# Patient Record
Sex: FEMALE | Race: BLACK OR AFRICAN AMERICAN
[De-identification: names, ages, dates, MRNs, and addresses within clinical notes are randomized per-mention and may not be internally consistent; named-entity substitution may affect disease eponyms.]

---

## 2018-02-07 ENCOUNTER — HOSPITAL ENCOUNTER (EMERGENCY)
Dept: HOSPITAL 17 - NEPC | Age: 28
Discharge: HOME | End: 2018-02-07
Payer: MEDICAID

## 2018-02-07 VITALS
DIASTOLIC BLOOD PRESSURE: 89 MMHG | OXYGEN SATURATION: 100 % | RESPIRATION RATE: 16 BRPM | HEART RATE: 83 BPM | SYSTOLIC BLOOD PRESSURE: 187 MMHG | TEMPERATURE: 98.3 F

## 2018-02-07 DIAGNOSIS — Z79.899: ICD-10-CM

## 2018-02-07 DIAGNOSIS — J11.1: Primary | ICD-10-CM

## 2018-02-07 DIAGNOSIS — K21.9: ICD-10-CM

## 2018-02-07 DIAGNOSIS — Z88.8: ICD-10-CM

## 2018-02-07 DIAGNOSIS — I10: ICD-10-CM

## 2018-02-07 LAB
COLOR UR: YELLOW
GLUCOSE UR STRIP-MCNC: (no result) MG/DL
HGB UR QL STRIP: (no result)
KETONES UR STRIP-MCNC: (no result) MG/DL
MUCOUS THREADS #/AREA URNS LPF: (no result) /LPF
NITRITE UR QL STRIP: (no result)
SP GR UR STRIP: 1.02 (ref 1–1.03)
SQUAMOUS #/AREA URNS HPF: 4 /HPF (ref 0–5)
URINE LEUKOCYTE ESTERASE: (no result)

## 2018-02-07 PROCEDURE — 99283 EMERGENCY DEPT VISIT LOW MDM: CPT

## 2018-02-07 PROCEDURE — 84703 CHORIONIC GONADOTROPIN ASSAY: CPT

## 2018-02-07 PROCEDURE — 81001 URINALYSIS AUTO W/SCOPE: CPT

## 2018-02-07 NOTE — PD
HPI


Chief Complaint:  Cold / Flu Symptoms


Time Seen by Provider:  07:44


Travel History


International Travel<30 days:  No


Contact w/Intl Traveler<30days:  No


Traveled to known affect area:  No





History of Present Illness


HPI


Patient is a 27-year-old female presents emergency department with runny nose 

cough congestion fever and body aches as well as some nausea and some 

intermittent leg weakness for the past 24-36 hours.  Her young child was sick 

after coming home from  a few days ago with similar symptoms.  She is 

concerned that she might have the flu.  Denies any chest pain shortness of 

breath abdominal pain or diarrhea.  Denies possibility of pregnancy, denies any 

vaginal bleeding or vaginal discharge.  Symptoms for the past 24-36 hours, 

gradually worsening, associated sinus symptoms as above, context as above





PFSH


Past Medical History


Autoimmune Disease:  No


Blood Disorders:  No


Anxiety:  No


Depression:  No


Cardiovascular Problems:  Yes (HTN)


Diminished Hearing:  No


Gastrointestinal Disorders:  Yes


GERD:  Yes


Genitourinary:  No


Hypertension:  Yes


Musculoskeletal:  No


Neurologic:  No


Psychiatric:  No


Respiratory:  No


Immunizations Current:  Yes


Tetanus Vaccination:  < 5 Years


Influenza Vaccination:  No


Pregnant?:  Not Pregnant


LMP:  2018


:  1


Para:  1


Miscarriage:  0


:  0





Past Surgical History


 Section:  Yes


Cholecystectomy:  Yes ()


Other Surgery:  No





Social History


Alcohol Use:  Yes (OCC)


Tobacco Use:  No


Substance Use:  No





Allergies-Medications


(Allergen,Severity, Reaction):  


Coded Allergies:  


     lisinopril (Unverified  Allergy, Unknown, 18)


Reported Meds & Prescriptions





Reported Meds & Active Scripts


Active


Zofran (Ondansetron HCl) 4 Mg Tab 4 Mg PO Q6HR PRN


Tamiflu (Oseltamivir Phosphate) 75 Mg Cap 75 Mg PO BID 5 Days


Reported


Amlodipine (Amlodipine Besylate) 2.5 Mg Tab 2.5 Mg PO DAILY


Hydrochlorothiazide 25 Mg Tab 25 Mg PO DAILY


Labetalol (Labetalol HCl) 100 Mg Tab 100 Mg PO TID








Review of Systems


Except as stated in HPI:  all other systems reviewed are Neg





Physical Exam


Narrative


GENERAL:  WD/WN in nad


SKIN: Warm and dry.


HEAD: Atraumatic. Normocephalic. 


EYES: Pupils equal and round. No scleral icterus. No injection or drainage. 


ENT: No nasal bleeding or discharge.  Mucous membranes pink and moist.  TM's 

clear bilat, Oropharynx with minimal erythema, no swelling, uvula midline.


NECK: Trachea midline. No JVD. 


CARDIOVASCULAR: Regular rate and rhythm.  


RESPIRATORY: No accessory muscle use. Clear to auscultation. Breath sounds 

equal bilaterally. 


GASTROINTESTINAL: Abdomen soft, non-tender, nondistended. Hepatic and splenic 

margins not palpable. 


MUSCULOSKELETAL: Extremities without clubbing, cyanosis, or edema. No obvious 

deformities. 


NEUROLOGICAL: Awake and alert. No obvious cranial nerve deficits.  Motor 

grossly within normal limits. Five out of 5 muscle strength in the arms and 

legs.  Normal speech.


PSYCHIATRIC: Appropriate mood and affect; insight and judgment normal.





Data


Data


Last Documented VS





Vital Signs








  Date Time  Temp Pulse Resp B/P (MAP) Pulse Ox O2 Delivery O2 Flow Rate FiO2


 


18 09:14        


 


18 07:35      Room Air  


 


18 07:27 98.3 83 16  100   








Orders





 Orders


Ed Urine Pregnancytest Poc (18 07:44)


Ua Includes Microscopic (18 07:44)


Ondansetron  Odt (Zofran  Odt) (18 08:45)


Ed Discharge Order (18 08:45)





Labs





Laboratory Tests








Test


  18


08:15


 


Urine Color YELLOW 


 


Urine Turbidity CLEAR 


 


Urine pH 7.0 


 


Urine Specific Gravity 1.024 


 


Urine Protein TRACE mg/dL 


 


Urine Glucose (UA) NEG mg/dL 


 


Urine Ketones NEG mg/dL 


 


Urine Occult Blood NEG 


 


Urine Nitrite NEG 


 


Urine Bilirubin NEG 


 


Urine Urobilinogen


  LESS THAN 2.0


MG/DL


 


Urine Leukocyte Esterase SMALL 


 


Urine RBC 1 /hpf 


 


Urine Squamous Epithelial


Cells 4 /hpf 


 


 


Urine Mucus FEW /lpf 











MDM


Medical Decision Making


Medical Screen Exam Complete:  Yes


Emergency Medical Condition:  Yes


Differential Diagnosis


Influenza, pneumonia, URI.


Narrative Course


Patient sign symptoms consistent with uncomplicated influenza.  Discussed 

symptomatic management had Tamiflu and discuss returning to criteria.  She is 

stable for discharge.





Diagnosis





 Primary Impression:  


 Influenza


***Med/Other Pt SpecificInfo:  Prescription(s) given


Scripts


Ondansetron (Zofran) 4 Mg Tab


4 MG PO Q6HR Y for NAUSEA OR VOMITING, #20 TAB 0 Refills


   Prov: Raji Ndiaye MD         18 


Oseltamivir (Tamiflu) 75 Mg Cap


75 MG PO BID for Mgmt Viral Infection for 5 Days, #10 CAP 0 Refills


   Prov: Raji Ndiaye MD         18


Disposition:  01 DISCHARGE HOME


Condition:  Stable











Raji Ndiaye MD 2018 08:02

## 2018-05-09 ENCOUNTER — HOSPITAL ENCOUNTER (EMERGENCY)
Dept: HOSPITAL 17 - NEPK | Age: 28
Discharge: LEFT BEFORE DISCHARGE | End: 2018-05-09
Payer: SELF-PAY

## 2018-05-09 VITALS
SYSTOLIC BLOOD PRESSURE: 156 MMHG | TEMPERATURE: 99 F | DIASTOLIC BLOOD PRESSURE: 85 MMHG | RESPIRATION RATE: 18 BRPM | OXYGEN SATURATION: 97 % | HEART RATE: 89 BPM

## 2018-05-09 VITALS — BODY MASS INDEX: 47.09 KG/M2 | WEIGHT: 293 LBS | HEIGHT: 66 IN

## 2018-05-09 DIAGNOSIS — R50.9: Primary | ICD-10-CM

## 2018-05-09 PROCEDURE — 99281 EMR DPT VST MAYX REQ PHY/QHP: CPT

## 2018-05-09 NOTE — PD
HPI


Chief Complaint:  Cold / Flu Symptoms


Time Seen by Provider:  13:20


Travel History


International Travel<30 days:  No


Contact w/Intl Traveler<30days:  No


Traveled to known affect area:  No





History of Present Illness


HPI


27 year-old female presents to the emergency room for evaluation of cold 

symptoms that started yesterday.  Patient reports a fever of 102 yesterday.  

Symptoms include sore throat, left-sided ear pain, nonproductive cough, nasal 

congestion, and sinus headache.  She has been taking over-the-counter 

medications and Tylenol/Motrin with moderate relief in symptoms.  She denies 

any chronic medical conditions or daily medications.  Last menstrual cycle was 

2 weeks ago.  She does deny any body aches, nausea, or vomiting.





History


Past Medical Histgory


LMP:  4/18/18





Social History


Alcohol Use:  Yes (OCC)


Tobacco Use:  No





Allergies-Medications


(Allergen,Severity, Reaction):  


Coded Allergies:  


     lisinopril (Unverified  Allergy, Unknown, 5/9/18)


Reported Meds & Prescriptions





Reported Meds & Active Scripts


Active


Reported


Claritin (Loratadine) 5 Mg Chew 5 Mg CHEW DAILY


Amlodipine (Amlodipine Besylate) 2.5 Mg Tab 2.5 Mg PO DAILY


Hydrochlorothiazide 25 Mg Tab 25 Mg PO DAILY


Labetalol (Labetalol HCl) 100 Mg Tab 100 Mg PO TID








Review of Systems


Except as stated in HPI:  all other systems reviewed are Neg





Physical Exam


Narrative


GENERAL: Well-nourished, well-developed female no acute distress.  Afebrile.  

Ambulatory.


SKIN: Focused skin assessment warm/dry.


HEAD: Normocephalic.


EYES: No scleral icterus. No injection or drainage. 


ENT: Mucosa pink and moist. No erythema or exudates. No uvular edema. No uvular

, palatal, or tonsillar deviation.  Airway patent. Nasal turbinates appear 

normal without nasal blood, purulent drainage or septal hematoma.


EARS: Bilateral pinnae and external canals appear within normal limits. 

Bilateral tympanic membranes without erythema, dullness or perforation.


NECK: Supple, trachea midline. No JVD or lymphadenopathy.


CARDIOVASCULAR: Regular rate and rhythm without murmurs, gallops, or rubs. 


RESPIRATORY: Breath sounds equal bilaterally. No accessory muscle use.  No 

crackles, rales, wheezes, rhonchi.





Data


Data


Last Documented VS





Vital Signs








  Date Time  Temp Pulse Resp B/P (MAP) Pulse Ox O2 Delivery O2 Flow Rate FiO2


 


5/9/18 12:27 99.0 89 18 156/85 (108) 97   











MDM


Medical Screen Exam Complete:  Yes


Emergency Medical Condition:  No


Differential Diagnosis


Upper respiratory infection


Narrative Course


27-year-old female presents to the emergency room for evaluation of cold 

symptoms that started yesterday.  Patient is afebrile well-appearing in the 

emergency room.  No evidence of bacterial infection.  This is viral URI.  No 

emergent or urgent medical conditions at this time.





A medical screening exam was performed: 


At the time of evaluation the presenting medical condition was determined not 

to be of an emergent nature. 


The patient was given the option of receiving additional care, but declined. 


Patient was given options for additional community resources from which to 

obtain care.


The Patient Has Been advised to seek medical attention for their presenting 

complaint.


The patient has been advised to return to the ER at any time if an emergent 

condition develops.





 Primary Impression:  


 Encounter for medical screening examination


Disposition:  01 DISCHARGE HOME


Condition:  Stable











Vicky Mcmahan May 9, 2018 13:46